# Patient Record
Sex: MALE | Race: WHITE | NOT HISPANIC OR LATINO | URBAN - NONMETROPOLITAN AREA
[De-identification: names, ages, dates, MRNs, and addresses within clinical notes are randomized per-mention and may not be internally consistent; named-entity substitution may affect disease eponyms.]

---

## 2023-08-07 PROBLEM — L29.9 ITCHING: Status: ACTIVE | Noted: 2023-08-07

## 2023-08-07 PROBLEM — G47.00 INSOMNIA: Status: ACTIVE | Noted: 2023-08-07

## 2023-08-07 PROBLEM — M19.90 ARTHRITIS: Status: ACTIVE | Noted: 2023-08-07

## 2023-08-07 PROBLEM — I10 HYPERTENSION: Status: ACTIVE | Noted: 2023-08-07

## 2023-08-07 PROBLEM — E03.9 HYPOTHYROIDISM: Status: ACTIVE | Noted: 2023-08-07

## 2023-08-07 PROBLEM — E78.5 HYPERLIPIDEMIA: Status: ACTIVE | Noted: 2023-08-07

## 2023-08-07 PROBLEM — C44.91 BASAL CELL CARCINOMA: Status: ACTIVE | Noted: 2023-08-07

## 2023-08-07 PROBLEM — R25.1 TREMOR OF BOTH HANDS: Status: ACTIVE | Noted: 2023-08-07

## 2023-08-07 PROBLEM — M10.9 GOUT: Status: ACTIVE | Noted: 2023-08-07
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
## 2023-08-10 PROBLEM — L24.9 IRRITANT DERMATITIS: Status: ACTIVE | Noted: 2023-08-10

## 2023-08-10 PROBLEM — K63.5 HYPERPLASTIC COLON POLYP: Status: ACTIVE | Noted: 2023-08-10

## 2023-08-10 PROBLEM — M72.2 PLANTAR FASCIITIS: Status: ACTIVE | Noted: 2023-08-10

## 2023-08-10 PROBLEM — J30.9 ALLERGIC RHINITIS: Status: ACTIVE | Noted: 2023-08-10

## 2023-08-10 RX ORDER — CETIRIZINE HYDROCHLORIDE 10 MG/1
1 TABLET ORAL NIGHTLY
COMMUNITY
Start: 2021-02-12

## 2023-08-10 RX ORDER — POTASSIUM CHLORIDE 20 MEQ/1
1 TABLET, EXTENDED RELEASE ORAL
COMMUNITY

## 2023-08-10 RX ORDER — TIZANIDINE HYDROCHLORIDE 4 MG/1
CAPSULE, GELATIN COATED ORAL
COMMUNITY

## 2023-08-10 RX ORDER — ASPIRIN 81 MG/1
TABLET ORAL
COMMUNITY

## 2023-08-10 RX ORDER — LEVOTHYROXINE SODIUM 25 UG/1
TABLET ORAL
COMMUNITY

## 2023-08-10 RX ORDER — ALLOPURINOL 300 MG/1
1 TABLET ORAL DAILY
COMMUNITY

## 2023-08-10 RX ORDER — NAPROXEN 500 MG/1
TABLET ORAL 2 TIMES DAILY
COMMUNITY

## 2023-08-10 RX ORDER — VIT C/E/ZN/COPPR/LUTEIN/ZEAXAN 250MG-90MG
1 CAPSULE ORAL DAILY
COMMUNITY

## 2023-08-10 RX ORDER — TRIAMTERENE AND HYDROCHLOROTHIAZIDE 37.5; 25 MG/1; MG/1
1 CAPSULE ORAL 2 TIMES DAILY
COMMUNITY

## 2023-08-10 RX ORDER — MELOXICAM 15 MG/1
TABLET ORAL
COMMUNITY
Start: 2022-02-17

## 2023-08-10 RX ORDER — HYDROCHLOROTHIAZIDE 25 MG/1
1 TABLET ORAL DAILY
COMMUNITY

## 2023-08-10 RX ORDER — SERTRALINE HYDROCHLORIDE 50 MG/1
TABLET, FILM COATED ORAL
COMMUNITY

## 2023-08-10 RX ORDER — SIMVASTATIN 40 MG/1
TABLET, FILM COATED ORAL
COMMUNITY

## 2023-08-15 ENCOUNTER — OFFICE VISIT (OUTPATIENT)
Dept: PRIMARY CARE | Facility: CLINIC | Age: 73
End: 2023-08-15
Payer: MEDICARE

## 2023-08-15 VITALS
DIASTOLIC BLOOD PRESSURE: 92 MMHG | HEIGHT: 69 IN | HEART RATE: 89 BPM | OXYGEN SATURATION: 94 % | WEIGHT: 224 LBS | BODY MASS INDEX: 33.18 KG/M2 | SYSTOLIC BLOOD PRESSURE: 153 MMHG

## 2023-08-15 DIAGNOSIS — E03.9 HYPOTHYROIDISM, UNSPECIFIED TYPE: ICD-10-CM

## 2023-08-15 DIAGNOSIS — E78.00 PURE HYPERCHOLESTEROLEMIA: ICD-10-CM

## 2023-08-15 DIAGNOSIS — L29.9 ITCHING: ICD-10-CM

## 2023-08-15 DIAGNOSIS — C44.91 BASAL CELL CARCINOMA (BCC), UNSPECIFIED SITE: ICD-10-CM

## 2023-08-15 DIAGNOSIS — M1A.9XX0 CHRONIC GOUT WITHOUT TOPHUS, UNSPECIFIED CAUSE, UNSPECIFIED SITE: ICD-10-CM

## 2023-08-15 DIAGNOSIS — F51.01 PRIMARY INSOMNIA: ICD-10-CM

## 2023-08-15 DIAGNOSIS — R25.1 TREMOR OF BOTH HANDS: ICD-10-CM

## 2023-08-15 DIAGNOSIS — I10 PRIMARY HYPERTENSION: ICD-10-CM

## 2023-08-15 DIAGNOSIS — M19.90 ARTHRITIS: Primary | ICD-10-CM

## 2023-08-15 PROCEDURE — 99213 OFFICE O/P EST LOW 20 MIN: CPT | Performed by: PHYSICIAN ASSISTANT

## 2023-08-15 PROCEDURE — 1036F TOBACCO NON-USER: CPT | Performed by: PHYSICIAN ASSISTANT

## 2023-08-15 PROCEDURE — 1160F RVW MEDS BY RX/DR IN RCRD: CPT | Performed by: PHYSICIAN ASSISTANT

## 2023-08-15 PROCEDURE — 1159F MED LIST DOCD IN RCRD: CPT | Performed by: PHYSICIAN ASSISTANT

## 2023-08-15 PROCEDURE — 3077F SYST BP >= 140 MM HG: CPT | Performed by: PHYSICIAN ASSISTANT

## 2023-08-15 PROCEDURE — 3080F DIAST BP >= 90 MM HG: CPT | Performed by: PHYSICIAN ASSISTANT

## 2023-08-15 RX ORDER — ESCITALOPRAM OXALATE 5 MG/1
2.5 TABLET ORAL DAILY
COMMUNITY

## 2023-08-15 ASSESSMENT — PATIENT HEALTH QUESTIONNAIRE - PHQ9
2. FEELING DOWN, DEPRESSED OR HOPELESS: NOT AT ALL
1. LITTLE INTEREST OR PLEASURE IN DOING THINGS: NOT AT ALL
SUM OF ALL RESPONSES TO PHQ9 QUESTIONS 1 AND 2: 0

## 2024-10-11 ENCOUNTER — OFFICE VISIT (OUTPATIENT)
Dept: URGENT CARE | Facility: URGENT CARE | Age: 74
End: 2024-10-11
Payer: OTHER GOVERNMENT

## 2024-10-11 VITALS
RESPIRATION RATE: 18 BRPM | WEIGHT: 207.23 LBS | SYSTOLIC BLOOD PRESSURE: 144 MMHG | HEART RATE: 100 BPM | BODY MASS INDEX: 30.6 KG/M2 | OXYGEN SATURATION: 97 % | DIASTOLIC BLOOD PRESSURE: 87 MMHG | TEMPERATURE: 98.2 F

## 2024-10-11 DIAGNOSIS — J32.9 RECURRENT SINUSITIS: ICD-10-CM

## 2024-10-11 DIAGNOSIS — J40 BRONCHITIS: Primary | ICD-10-CM

## 2024-10-11 RX ORDER — ALBUTEROL SULFATE 90 UG/1
2 INHALANT RESPIRATORY (INHALATION) EVERY 4 HOURS PRN
Qty: 8.5 G | Refills: 0 | Status: SHIPPED | OUTPATIENT
Start: 2024-10-11 | End: 2025-10-11

## 2024-10-11 RX ORDER — INHALER, ASSIST DEVICES
SPACER (EA) MISCELLANEOUS
Qty: 1 EACH | Refills: 0 | Status: SHIPPED | OUTPATIENT
Start: 2024-10-11 | End: 2025-10-11

## 2024-10-11 RX ORDER — AMOXICILLIN AND CLAVULANATE POTASSIUM 875; 125 MG/1; MG/1
875 TABLET, FILM COATED ORAL 2 TIMES DAILY
Qty: 20 TABLET | Refills: 0 | Status: SHIPPED | OUTPATIENT
Start: 2024-10-11

## 2024-10-11 RX ORDER — ALBUTEROL SULFATE 0.83 MG/ML
2.5 SOLUTION RESPIRATORY (INHALATION) ONCE
Status: COMPLETED | OUTPATIENT
Start: 2024-10-11 | End: 2024-10-11

## 2024-10-11 NOTE — PROGRESS NOTES
Pt states he went to [facility] express care 2 weeks ago, pt was prescribed doxycycline and Astelin nasal spray. Pt states it started to help but came right back.

## 2024-10-11 NOTE — PROGRESS NOTES
Subjective   Patient ID: Gee Hernandez is a 74 y.o. male. They present today with a chief complaint of Sinus Problem (Sinus drainage 2 months ) and Fatigue (2 months ).    History of Present Illness  HPI  Pt presents with 8 weeks of sinus congestion and drainage, productive cough clear sputum and wheeze in morning. He was seen by [facility] and prescribed Azelastine spray and Doxycycline x 10 days. He completed antibiotic and reports 2-3 days of symptom relief and gradual onset of congestion over past couple days. He takes zyrtec once or twice a day. He also has tried ibuprofen and tylenol as needed. He has tried alkaseltzer plus once but reports increased sleepiness and did not continue. Pt follows with ENT but unable to reach them or his PCP due to upcoming weekend.    Past Medical History  Allergies as of 10/11/2024 - Reviewed 10/11/2024   Allergen Reaction Noted    Bacitracin Unknown 08/10/2023    Hydrocodone-acetaminophen Unknown 08/10/2023    Hydrocodone-guaifenesin Unknown 08/10/2023    Orphenadrine-asa-caffeine Unknown 08/10/2023    Triamcinolone Unknown 08/10/2023       (Not in a hospital admission)       Past Medical History:   Diagnosis Date    Personal history of other diseases of the nervous system and sense organs     History of Meniere's disease    Personal history of other diseases of the nervous system and sense organs     History of tinnitus    Personal history of other endocrine, nutritional and metabolic disease     History of hypothyroidism       Past Surgical History:   Procedure Laterality Date    OTHER SURGICAL HISTORY  02/07/2020    Hernia repair    OTHER SURGICAL HISTORY  02/07/2020    Appendectomy    OTHER SURGICAL HISTORY  02/07/2020    Ankle surgery        reports that he quit smoking about 12 years ago. His smoking use included cigarettes. He has never used smokeless tobacco. He reports that he does not currently use alcohol. He reports that he does not use drugs.    Review of  Systems  Review of Systems                               Objective    Vitals:    10/11/24 1448 10/11/24 1538   BP: 144/87    BP Location: Right arm    Patient Position: Sitting    Pulse: 107 100   Resp: 18    Temp: 36.8 °C (98.2 °F)    TempSrc: Temporal    SpO2: 97% 97%   Weight: 94 kg (207 lb 3.7 oz)      No LMP for male patient.    Physical Exam  Vitals and nursing note reviewed.   Constitutional:       General: He is not in acute distress.     Appearance: Normal appearance. He is not ill-appearing.      Comments: Pleasant white male, hard of hearing   HENT:      Head: Normocephalic and atraumatic.      Right Ear: Tympanic membrane, ear canal and external ear normal.      Left Ear: Tympanic membrane, ear canal and external ear normal.      Ears:      Comments: B/L hearing aid present     Nose: Congestion and rhinorrhea present.      Comments: Deviated septum to right, copious clear nasal drainage     Mouth/Throat:      Mouth: Mucous membranes are moist.      Pharynx: Posterior oropharyngeal erythema present.   Eyes:      Extraocular Movements: Extraocular movements intact.      Conjunctiva/sclera: Conjunctivae normal.      Pupils: Pupils are equal, round, and reactive to light.   Cardiovascular:      Rate and Rhythm: Normal rate and regular rhythm.      Heart sounds: No murmur heard.  Pulmonary:      Effort: Pulmonary effort is normal.      Breath sounds: No wheezing or rhonchi.      Comments: Mildly diminished breath sounds b/l  Musculoskeletal:         General: Normal range of motion.      Cervical back: Normal range of motion and neck supple.      Comments: Tremor of both arms with use, no tremor at rest   Lymphadenopathy:      Cervical: No cervical adenopathy.   Skin:     General: Skin is warm and dry.   Neurological:      General: No focal deficit present.      Mental Status: He is alert and oriented to person, place, and time.   Psychiatric:         Mood and Affect: Mood normal.         Procedures    Point  of Care Test & Imaging Results from this visit  No results found for this visit on 10/11/24.   No results found.    Diagnostic study results (if any) were reviewed by Raysa Scott PA-C.    Assessment/Plan   Allergies, medications, history, and pertinent labs/EKGs/Imaging reviewed by Raysa Scott PA-C.     Orders and Diagnoses  Diagnoses and all orders for this visit:  Bronchitis  -     albuterol 2.5 mg /3 mL (0.083 %) nebulizer solution 2.5 mg  -     albuterol (ProAir HFA) 90 mcg/actuation inhaler; Inhale 2 puffs every 4 hours if needed for wheezing or shortness of breath.  -     inhalational spacing device (Aerochamber MV) inhaler; Use as instructed with Albuterol inhaler every 6hr  Recurrent sinusitis  -     amoxicillin-pot clavulanate (Augmentin) 875-125 mg tablet; Take 1 tablet (875 mg) by mouth 2 times a day.      75 yo M presents with sinus congestion,drainage, productive cough and wheeze x 8 weeks improved for couple days after completing doxycycline 10 day course now returning symptoms. Reviewed vitals, stable. Exam remarkable for congestion, rhinorrhea, pharyngeal erythema, diminished breath sounds. Discussed treatment Acute on Chronic Sinusitis-   Start Augmentin twice a day x 10 days, take with food and probiotic. Recommend saline nasal spray every 2 hr as needed. Increase fluids water or Pedialyte 1 L/day.    Acute Bronchitis with wheeze in morning-   Albuterol nebulizer trialed. Preneb and Postneb O2 unchanged however pt felt he can breath better through his nose and chest. Start Albuterol inhaler with spacer 2 puffs every 6h as needed cough or wheeze.  Recommend Cool Mist Humidifier, Vicks Chest RUB, OTC expectorant with plenty of fluids, Trial of Flonase nasal spray 2 sprays each nostril daily or 1 spray twice a day x 3-7 days. Continue Cetrizine 10mg daily. Pt is former smoker, quit 13 years ago.    Follow up with PCP or ENT on Monday. Go to ER if worsening breathing or fever >103  or dizziness    Administrations This Visit       albuterol 2.5 mg /3 mL (0.083 %) nebulizer solution 2.5 mg       Admin Date  10/11/2024 Action  Given Dose  2.5 mg Route  nebulization Documented By  Jennifer Rosas MA                    Patient disposition: Home    Electronically signed by Raysa Scott PA-C  6:36 PM